# Patient Record
Sex: FEMALE | Race: BLACK OR AFRICAN AMERICAN | NOT HISPANIC OR LATINO | URBAN - METROPOLITAN AREA
[De-identification: names, ages, dates, MRNs, and addresses within clinical notes are randomized per-mention and may not be internally consistent; named-entity substitution may affect disease eponyms.]

---

## 2017-06-21 ENCOUNTER — EMERGENCY (EMERGENCY)
Facility: HOSPITAL | Age: 27
LOS: 1 days | Discharge: HOME | End: 2017-06-21
Attending: EMERGENCY MEDICINE | Admitting: EMERGENCY MEDICINE
Payer: COMMERCIAL

## 2017-06-21 VITALS
SYSTOLIC BLOOD PRESSURE: 118 MMHG | TEMPERATURE: 98 F | DIASTOLIC BLOOD PRESSURE: 73 MMHG | RESPIRATION RATE: 16 BRPM | OXYGEN SATURATION: 98 % | HEART RATE: 109 BPM

## 2017-06-21 VITALS
RESPIRATION RATE: 16 BRPM | DIASTOLIC BLOOD PRESSURE: 59 MMHG | SYSTOLIC BLOOD PRESSURE: 112 MMHG | OXYGEN SATURATION: 99 % | HEART RATE: 97 BPM

## 2017-06-21 PROCEDURE — 99284 EMERGENCY DEPT VISIT MOD MDM: CPT | Mod: 25

## 2017-06-21 PROCEDURE — 76882 US LMTD JT/FCL EVL NVASC XTR: CPT | Mod: 26

## 2017-06-21 RX ORDER — DEXAMETHASONE 0.5 MG/5ML
10 ELIXIR ORAL ONCE
Qty: 0 | Refills: 0 | Status: COMPLETED | OUTPATIENT
Start: 2017-06-21 | End: 2017-06-21

## 2017-06-21 RX ORDER — AMOXICILLIN 250 MG/5ML
0 SUSPENSION, RECONSTITUTED, ORAL (ML) ORAL
Qty: 0 | Refills: 0 | COMMUNITY

## 2017-06-21 RX ORDER — KETOROLAC TROMETHAMINE 30 MG/ML
30 SYRINGE (ML) INJECTION ONCE
Qty: 0 | Refills: 0 | Status: DISCONTINUED | OUTPATIENT
Start: 2017-06-21 | End: 2017-06-21

## 2017-06-21 RX ADMIN — Medication 30 MILLIGRAM(S): at 08:55

## 2017-06-21 RX ADMIN — Medication 10 MILLIGRAM(S): at 08:55

## 2017-06-21 NOTE — ED PROVIDER NOTE - PROGRESS NOTE DETAILS
MIGUE Bronson: The scribe's documentation has been prepared under my direction and personally reviewed by me in its entirety. I confirm that the note above accurately reflects all work, treatment, procedures, and medical decision making performed by me. MIGUE Bronson: pt feels better pain improved.  Will discharge home.

## 2017-06-21 NOTE — ED ADULT NURSE NOTE - OBJECTIVE STATEMENT
Pt C/O sore throat and left neck pain, worse with swallowing x3 days. No Difficulty breathing, pt in NAD.  Medicated as per order. Will continue to monitor.

## 2017-06-21 NOTE — ED PROCEDURE NOTE - PROCEDURE ADDITIONAL DETAILS
[address], ultrasound, musculoskeletal, limited    Focused ED Ultrasound of left tonsilar area to assess for perironsinlar abscess    Findings: enlarged tonsil    Impression: no abscess identified    Procedure note and images placed in chart

## 2017-06-21 NOTE — ED PROVIDER NOTE - OBJECTIVE STATEMENT
27 y/o F pt with no significant PMHx and PSHx of  presents to the ED for throat pain x3 days, subjective fever, chills and headache. Patient was evaluated by PCP yesterday and prescribed Amoxicillin. She has taken 3 doses so far. States taking Advil which provided no relief. Denies cough or congestion. NKDA 27 y/o Female pt with no significant PMHx presents to the ER c/o left sided throat pain x3 days, subjective fevers, chills and headache. Patient was evaluated by her PMD yesterday and was prescribed Amoxicillin. Denies cough, congestion, rash, nausea, vomiting, abdominal pain. NKDA

## 2017-06-21 NOTE — ED PROVIDER NOTE - CARE PLAN
Principal Discharge DX:	Pharyngitis  Instructions for follow-up, activity and diet:	Follow up with your Doctor in 1-2 days.  Continue your Antibiotics as prescribed by your Doctor.    Salt water gargles.  Take Motrin 600mg orally every 8 hours as needed for pain take with food.  Return to the ER for any persistent/worsening or new symptoms fevers, chills, unable to eat/drink, weakness, dizziness or any concerning symptoms.

## 2017-06-21 NOTE — ED PROVIDER NOTE - PLAN OF CARE
Follow up with your Doctor in 1-2 days.  Continue your Antibiotics as prescribed by your Doctor.    Salt water gargles.  Take Motrin 600mg orally every 8 hours as needed for pain take with food.  Return to the ER for any persistent/worsening or new symptoms fevers, chills, unable to eat/drink, weakness, dizziness or any concerning symptoms.

## 2017-06-21 NOTE — ED PROVIDER NOTE - MEDICAL DECISION MAKING DETAILS
25 y/o F pt presenting with throat pain x3 days. Well appearing and in no acute distress. Will provide Toradol and Decadron. Instructed patient to continue taking antibiotics as prescribed by PCP. Patient is to follow up with PCP. 25 y/o Female pt presenting with sore throat x3 days, pt is well appearing, NAD.  Will give Toradol and Decadron in ER, continue taking antibiotics as prescribed by PMD. Follow up with PMD.

## 2017-06-21 NOTE — ED ADULT TRIAGE NOTE - CHIEF COMPLAINT QUOTE
Pt comes in for c/o sore throat x3 days. Pt was seen by PMD and prescribed abx and states she took x3 doses with no releif. pt in no acute distress, vs as noted. No drooling or difficulty breathing noted, pt speaking in full sentences

## 2024-03-08 NOTE — ED ADULT NURSE NOTE - PRIMARY CARE PROVIDER
unknown Consent: The patient's consent was obtained including but not limited to risks of crusting, scabbing, blistering, scarring, darker or lighter pigmentary change, recurrence, incomplete removal and infection. Post-Care Instructions: I reviewed with the patient in detail post-care instructions. Patient is to wear sunprotection, and avoid picking at any of the treated lesions. Pt may apply Vaseline to crusted or scabbing areas. Render Post-Care Instructions In Note?: yes Detail Level: Detailed Render Note In Bullet Format When Appropriate: No Duration Of Freeze Thaw-Cycle (Seconds): 0

## 2025-01-15 ENCOUNTER — OUTPATIENT (OUTPATIENT)
Dept: OUTPATIENT SERVICES | Facility: HOSPITAL | Age: 35
LOS: 1 days | End: 2025-01-15

## 2025-01-15 VITALS
DIASTOLIC BLOOD PRESSURE: 73 MMHG | HEIGHT: 64 IN | TEMPERATURE: 98 F | HEART RATE: 92 BPM | WEIGHT: 205.03 LBS | OXYGEN SATURATION: 98 % | RESPIRATION RATE: 16 BRPM | SYSTOLIC BLOOD PRESSURE: 107 MMHG

## 2025-01-15 DIAGNOSIS — N62 HYPERTROPHY OF BREAST: ICD-10-CM

## 2025-01-15 DIAGNOSIS — Z98.891 HISTORY OF UTERINE SCAR FROM PREVIOUS SURGERY: Chronic | ICD-10-CM

## 2025-01-15 DIAGNOSIS — Z98.890 OTHER SPECIFIED POSTPROCEDURAL STATES: Chronic | ICD-10-CM

## 2025-01-15 LAB — HCG UR QL: POSITIVE

## 2025-01-15 NOTE — H&P PST ADULT - NSALCOHOLPROBLEMSRELYN_GEN_A_CORE_SD
Pt had a breath test and was informed we would have the results by the end of this week.  She would like to know if we have the results and what they are and if she needs to do anything   no

## 2025-01-15 NOTE — H&P PST ADULT - HISTORY OF PRESENT ILLNESS
Pt  Pt is a 34 yr old female scheduled for B/L Breast Reduction with Dr Cagle - pt c/o upper back and neck pain r/t hypertrophy breasts

## 2025-02-12 ENCOUNTER — OUTPATIENT (OUTPATIENT)
Dept: OUTPATIENT SERVICES | Facility: HOSPITAL | Age: 35
LOS: 1 days | End: 2025-02-12

## 2025-02-12 VITALS
HEART RATE: 82 BPM | SYSTOLIC BLOOD PRESSURE: 125 MMHG | TEMPERATURE: 98 F | WEIGHT: 203.05 LBS | DIASTOLIC BLOOD PRESSURE: 84 MMHG | HEIGHT: 63 IN | OXYGEN SATURATION: 97 % | RESPIRATION RATE: 15 BRPM

## 2025-02-12 DIAGNOSIS — Z98.891 HISTORY OF UTERINE SCAR FROM PREVIOUS SURGERY: Chronic | ICD-10-CM

## 2025-02-12 DIAGNOSIS — N39.0 URINARY TRACT INFECTION, SITE NOT SPECIFIED: ICD-10-CM

## 2025-02-12 DIAGNOSIS — Z98.890 OTHER SPECIFIED POSTPROCEDURAL STATES: Chronic | ICD-10-CM

## 2025-02-12 DIAGNOSIS — N62 HYPERTROPHY OF BREAST: ICD-10-CM

## 2025-02-12 PROBLEM — E66.9 OBESITY, UNSPECIFIED: Chronic | Status: ACTIVE | Noted: 2025-01-15

## 2025-02-12 NOTE — H&P PST ADULT - HISTORY OF PRESENT ILLNESS
34 year old female, presents to [facility], with pre op diagnosis of hypertrophy of bilateral breast, for pre op evaluation prior to scheduled for bilateral breast reduction with Dr Cagle. Patient complaints of b/l upper back pain, neck pain for couple of months.          ****Patient was scheduled for surgery on 01/23/25, which was cancelled due to positive pregnancy test. 34 year old female, presents to [facility], with pre op diagnosis of hypertrophy of bilateral breast, for pre op evaluation prior to scheduled for bilateral breast reduction with Dr Cagle. Patient complaints of b/l upper back pain, neck pain for couple of months due to macromastia.          ****Patient was scheduled for surgery on 01/23/25, which was cancelled due to positive pregnancy test.

## 2025-02-12 NOTE — H&P PST ADULT - PROBLEM SELECTOR PLAN 1
Patient is tentatively scheduled for bilateral breast reduction with Dr Cagle- 2025.    Pre-op instructions provided. Pt given verbal and written instructions with teach back on chlorhexidine wash and pepcid. Pt verbalized understanding with return demonstration.    Urine cup provided for day of procedure pregnancy test.    ******patient reports of LMP -was 26, pt had positive pregnancy test with last PST in 2025, surgery was cancelled, patient reports [facility], pt developed [facility]TI symptoms, after , followed up in urgent care, started on amoxicillin on 25  Last HCG - 2025- indeterminate

## 2025-02-12 NOTE — H&P PST ADULT - PSYCHIATRIC
Equal and normal pulses (carotid, femoral, dorsalis pedis) details… normal affect/alert and oriented x3

## 2025-02-12 NOTE — H&P PST ADULT - GENITOURINARY COMMENTS
pre op dx- hypertrophy of breast patient reports of LMP -was 26, pt had positive pregnancy test with last PST in 2025, surgery was cancelled, patient reports [facility], pt developed UTI symptoms followed up in urgent care, started on amoxicillin 25

## 2025-02-20 ENCOUNTER — OUTPATIENT (OUTPATIENT)
Dept: OUTPATIENT SERVICES | Facility: HOSPITAL | Age: 35
LOS: 1 days | Discharge: HOME | End: 2025-02-20
Payer: COMMERCIAL

## 2025-02-20 ENCOUNTER — TRANSCRIPTION ENCOUNTER (OUTPATIENT)
Age: 35
End: 2025-02-20

## 2025-02-20 VITALS
OXYGEN SATURATION: 100 % | RESPIRATION RATE: 15 BRPM | HEART RATE: 100 BPM | DIASTOLIC BLOOD PRESSURE: 71 MMHG | TEMPERATURE: 97 F | SYSTOLIC BLOOD PRESSURE: 103 MMHG

## 2025-02-20 VITALS
SYSTOLIC BLOOD PRESSURE: 117 MMHG | HEART RATE: 78 BPM | DIASTOLIC BLOOD PRESSURE: 86 MMHG | TEMPERATURE: 98 F | WEIGHT: 202.83 LBS | OXYGEN SATURATION: 100 % | RESPIRATION RATE: 18 BRPM

## 2025-02-20 DIAGNOSIS — Z98.890 OTHER SPECIFIED POSTPROCEDURAL STATES: Chronic | ICD-10-CM

## 2025-02-20 DIAGNOSIS — Z98.891 HISTORY OF UTERINE SCAR FROM PREVIOUS SURGERY: Chronic | ICD-10-CM

## 2025-02-20 DIAGNOSIS — N62 HYPERTROPHY OF BREAST: ICD-10-CM

## 2025-02-20 LAB — HCG SERPL-ACNC: 5.5 MIU/ML — SIGNIFICANT CHANGE UP

## 2025-02-20 PROCEDURE — 88305 TISSUE EXAM BY PATHOLOGIST: CPT | Mod: 26

## 2025-02-20 DEVICE — CLIP APPLIER COVIDIEN SURGICLIP 11.5" MEDIUM: Type: IMPLANTABLE DEVICE | Site: BILATERAL | Status: FUNCTIONAL

## 2025-02-20 RX ORDER — SODIUM CHLORIDE 9 G/1000ML
1000 INJECTION, SOLUTION INTRAVENOUS
Refills: 0 | Status: ACTIVE | OUTPATIENT
Start: 2025-02-20 | End: 2026-01-19

## 2025-02-20 RX ORDER — AMOXICILLIN 250 MG
1 CAPSULE ORAL
Refills: 0 | DISCHARGE

## 2025-02-20 NOTE — ASU DISCHARGE PLAN (ADULT/PEDIATRIC) - FINANCIAL ASSISTANCE
[facility] provides services at a reduced cost to those who are determined to be eligible through [facility]’s financial assistance program. Information regarding [facility]’s financial assistance program can be found by going to [email or web address] or by calling [phone].

## 2025-02-20 NOTE — ASU DISCHARGE PLAN (ADULT/PEDIATRIC) - MEDICATION INSTRUCTIONS
antibiotics and oxycodone antibiotics and oxycodone. next tylenol at 7pm antibiotics and oxycodone. next tylenol at 11pm

## 2025-02-26 LAB — SURGICAL PATHOLOGY STUDY: SIGNIFICANT CHANGE UP

## (undated) DEVICE — POSITIONER FOAM EGG CRATE ULNAR 2PCS (PINK)

## (undated) DEVICE — SUT MONOCRYL 3-0 27" PS-2 UNDYED

## (undated) DEVICE — MARKING PEN W RULER / LABELS

## (undated) DEVICE — DRSG DERMABOND PRINEO 60CM

## (undated) DEVICE — SOL IRR POUR H2O 500ML

## (undated) DEVICE — PACK MINOR

## (undated) DEVICE — POSITIONER PATIENT SAFETY STRAP 3X60"

## (undated) DEVICE — STAPLER SKIN PROXIMATE

## (undated) DEVICE — ELCTR ROCKER SWITCH PENCIL BLUE 10FT

## (undated) DEVICE — DRSG KERLIX MED 6"

## (undated) DEVICE — DRAPE SPLIT SHEET 77" X 108"

## (undated) DEVICE — BLADE SURGICAL #10 STAINLESS

## (undated) DEVICE — DRSG CURITY GAUZE SPONGE 4 X 4" 12-PLY

## (undated) DEVICE — SUT MONOCRYL 4-0 27" PS-2 UNDYED

## (undated) DEVICE — DRAPE TOWEL BLUE 17" X 24"

## (undated) DEVICE — PREP BETADINE KIT

## (undated) DEVICE — DRAIN JACKSON PRATT 10MM FLAT FULL NO TROCAR

## (undated) DEVICE — SUT MONOCRYL 2-0 27" SH UNDYED

## (undated) DEVICE — DRSG STERISTRIPS 0.5 X 4"

## (undated) DEVICE — DRAPE 3/4 SHEET 52X76"

## (undated) DEVICE — DRAPE POUCH STERI INSTRUMENT

## (undated) DEVICE — VENODYNE/SCD SLEEVE CALF MEDIUM

## (undated) DEVICE — SUT NYLON 2-0 18" FS

## (undated) DEVICE — LAP PAD W RING 18 X 18"

## (undated) DEVICE — ELCTR BOVIE BLADE 3/4" EXTENDED LENGTH 6"

## (undated) DEVICE — STOPCOCK 3 WAY

## (undated) DEVICE — BLADE SURGICAL #15 CARBON

## (undated) DEVICE — SUT SILK 0 18" PSL

## (undated) DEVICE — DRSG XEROFORM 5 X 9"

## (undated) DEVICE — DRSG COMBINE 5X9"

## (undated) DEVICE — SUT QUILL MONODERM 2-0 30CM 19MM

## (undated) DEVICE — DRAIN RESERVOIR FOR JACKSON PRATT 100CC CARDINAL

## (undated) DEVICE — GLV 7 PROTEXIS (WHITE)

## (undated) DEVICE — SYR LUER LOK 50CC

## (undated) DEVICE — WARMING BLANKET LOWER ADULT

## (undated) DEVICE — SUT PLAIN GUT FAST ABSORBING 5-0 PC-1

## (undated) DEVICE — ELCTR GROUNDING PAD ADULT COVIDIEN

## (undated) DEVICE — Device

## (undated) DEVICE — STAPLER SKIN VISI-STAT 35 WIDE

## (undated) DEVICE — SOL IRR POUR NS 0.9% 500ML